# Patient Record
Sex: MALE | Employment: FULL TIME | URBAN - METROPOLITAN AREA
[De-identification: names, ages, dates, MRNs, and addresses within clinical notes are randomized per-mention and may not be internally consistent; named-entity substitution may affect disease eponyms.]

---

## 2020-06-12 ENCOUNTER — OFFICE VISIT (OUTPATIENT)
Dept: FAMILY MEDICINE | Facility: CLINIC | Age: 30
End: 2020-06-12
Payer: COMMERCIAL

## 2020-06-12 VITALS
SYSTOLIC BLOOD PRESSURE: 124 MMHG | HEART RATE: 90 BPM | WEIGHT: 276 LBS | OXYGEN SATURATION: 97 % | TEMPERATURE: 99.2 F | DIASTOLIC BLOOD PRESSURE: 88 MMHG | HEIGHT: 66 IN | BODY MASS INDEX: 44.36 KG/M2

## 2020-06-12 DIAGNOSIS — H60.391 INFECTIVE OTITIS EXTERNA, RIGHT: Primary | ICD-10-CM

## 2020-06-12 DIAGNOSIS — Z23 NEED FOR VACCINATION: ICD-10-CM

## 2020-06-12 PROBLEM — E66.01 MORBID OBESITY (H): Status: ACTIVE | Noted: 2020-06-12

## 2020-06-12 PROCEDURE — 99203 OFFICE O/P NEW LOW 30 MIN: CPT | Mod: 25 | Performed by: INTERNAL MEDICINE

## 2020-06-12 PROCEDURE — 90471 IMMUNIZATION ADMIN: CPT | Performed by: INTERNAL MEDICINE

## 2020-06-12 PROCEDURE — 90715 TDAP VACCINE 7 YRS/> IM: CPT | Performed by: INTERNAL MEDICINE

## 2020-06-12 RX ORDER — NEOMYCIN SULFATE, POLYMYXIN B SULFATE, HYDROCORTISONE 3.5; 10000; 1 MG/ML; [USP'U]/ML; MG/ML
3 SOLUTION/ DROPS AURICULAR (OTIC) 4 TIMES DAILY
Qty: 10 ML | Refills: 0 | Status: SHIPPED | OUTPATIENT
Start: 2020-06-12

## 2020-06-12 ASSESSMENT — MIFFLIN-ST. JEOR: SCORE: 2169.68

## 2020-06-12 NOTE — PROGRESS NOTES
"Subjective     Chico Mosley is a 29 year old male who presents to clinic today for the following health issues:    HPI   Concern - Right Ear Pain  Onset: 06/10/2020    Description:   Right Ear pain, throbbing and having a hard time hearing.    Intensity: 8/10    Progression of Symptoms:  worsening    Accompanying Signs & Symptoms:  None    Previous history of similar problem:   Once a ear he gets a ear ache    Precipitating factors:   Worsened by: laying on the ear, touching    Alleviating factors:  Improved by: nothing    Therapies Tried and outcome: Over the counter ear drops that he said made it feel worse.      Chico developed right ear pain a couple days ago.  There has been slight drainage. Very painful.  No URI symptoms.     He is otherwise healthy, on no medications. Works at a [employer].     Reviewed and updated as needed this visit by Provider  Meds  Problems         Review of Systems   Const, HEENT reviewed,  otherwise negative unless noted above.        Objective    /88   Pulse 90   Temp 99.2  F (37.3  C) (Tympanic)   Ht 1.676 m (5' 6\")   Wt 125.2 kg (276 lb)   SpO2 97%   BMI 44.55 kg/m    Body mass index is 44.55 kg/m .  Physical Exam   Gen: pleasant, obese young man, no distress  HEENT: R ear canal is erythematous and swollen, TM appears normal.  L ear canal and TM normal.   Psych: normal affect, linear, appropriate             Assessment & Plan     1. Infective otitis externa, right  Ibuprofen and tylenol for pain control   - neomycin-polymyxin-hydrocortisone (CORTISPORIN) 3.5-96021-5 otic solution; Place 3 drops into the right ear 4 times daily For 5-7 days  Dispense: 10 mL; Refill: 0    2. Need for vaccination  - TDAP VACCINE (ADACEL) [28471.002]           Return in about 2 days (around 6/14/2020) for If no improvement.    Isabelle Rodas MD  [facility] [place]          "

## 2020-06-29 ENCOUNTER — OFFICE VISIT (OUTPATIENT)
Dept: FAMILY MEDICINE | Facility: CLINIC | Age: 30
End: 2020-06-29
Payer: COMMERCIAL

## 2020-06-29 ENCOUNTER — TELEPHONE (OUTPATIENT)
Dept: FAMILY MEDICINE | Facility: CLINIC | Age: 30
End: 2020-06-29

## 2020-06-29 VITALS
DIASTOLIC BLOOD PRESSURE: 80 MMHG | WEIGHT: 278 LBS | BODY MASS INDEX: 44.87 KG/M2 | SYSTOLIC BLOOD PRESSURE: 121 MMHG | OXYGEN SATURATION: 97 % | TEMPERATURE: 98.1 F | HEART RATE: 81 BPM

## 2020-06-29 DIAGNOSIS — H60.391 INFECTIVE OTITIS EXTERNA, RIGHT: Primary | ICD-10-CM

## 2020-06-29 PROCEDURE — 99213 OFFICE O/P EST LOW 20 MIN: CPT | Performed by: INTERNAL MEDICINE

## 2020-06-29 RX ORDER — CIPROFLOXACIN AND DEXAMETHASONE 3; 1 MG/ML; MG/ML
4 SUSPENSION/ DROPS AURICULAR (OTIC) 2 TIMES DAILY
Qty: 1 BOTTLE | Refills: 0 | Status: SHIPPED | OUTPATIENT
Start: 2020-06-29

## 2020-06-29 SDOH — HEALTH STABILITY: MENTAL HEALTH: HOW OFTEN DO YOU HAVE A DRINK CONTAINING ALCOHOL?: 2-3 TIMES A WEEK

## 2020-06-29 NOTE — TELEPHONE ENCOUNTER
Reason for Call:  Other prescription    Detailed comments: Pt was told to call in if rx is too expensive at the pharmacy. With insurance, out of pocket costs is still over $300 for ciprofloxacin-dexamethasone (CIPRODEX) 0.3-0.1 % otic suspension .    Pt is wondering if there is another prescription they can try. Pharmacy is [id number] [place] [address] [place] [address].    Phone Number Patient can be reached at: Cell number on file:    Telephone Information:   Mobile [phone]       Best Time: any    Can we leave a detailed message on this number? YES    Call taken on 6/29/2020 at 5:35 PM by Maribeth Briceno

## 2020-06-29 NOTE — PROGRESS NOTES
Subjective     Chico Mosley is a 29 year old male who presents to clinic today for the following health issues:    HPI   Acute Illness   Acute illness concerns:  Ear pain   Onset:  Over 2 weeks     Fever: no     Chills/Sweats: no     Headache (location?): no     Sinus Pressure:no    Conjunctivitis:  no    Ear Pain: YES: right    Rhinorrhea: no     Congestion: no     Sore Throat: no      Cough: no    Wheeze: no     Decreased Appetite: no     Nausea: no     Vomiting: no     Diarrhea:  no     Dysuria/Freq.: no     Fatigue/Achiness: no     Sick/Strep Exposure: no      Therapies Tried and outcome:  Ear drops     I saw Chico on 6/12 with otitis externa.  He took cortisporin drops for about 6 days, and the symptoms resolved.  Then about a week later, 6/24, started getting the same symptoms again so restarted the drops.  It is better pain-wise, but only about 50% better after 5 days of drops and he can't hear anything in the morning.  Hasn't noticed any drainage.   Can't hear anything in the morning             Reviewed and updated as needed this visit by Provider         Review of Systems   Constitutional, HEENT, cardiovascular, pulmonary, gi and gu systems are negative, except as otherwise noted.      Objective    /80   Pulse 81   Temp 98.1  F (36.7  C) (Tympanic)   Wt 126.1 kg (278 lb)   SpO2 97%   BMI 44.87 kg/m    Body mass index is 44.87 kg/m .  Physical Exam   Gen: pleasant, well appearing man, no distress   HEENT: R ear canal is erythematous and swollen with some possible exudate, but can see to TM and appears intact. L ear canal and TM are normal.             Assessment & Plan     1. Infective otitis externa, right  Not improving now with cortisporin, will try new abx.  If this isn't effective, will need to see ENT.   - ciprofloxacin-dexamethasone (CIPRODEX) 0.3-0.1 % otic suspension; Place 4 drops into the right ear 2 times daily  Dispense: 1 Bottle; Refill: 0     BMI:   Estimated body mass index  "is 44.87 kg/m  as calculated from the following:    Height as of 6/12/20: 1.676 m (5' 6\").    Weight as of this encounter: 126.1 kg (278 lb).           Return in about 3 days (around 7/2/2020) for If no improvement.    Isabelle Rodas MD  [facility] [place]      "

## 2020-06-30 RX ORDER — TOBRAMYCIN AND DEXAMETHASONE 3; 1 MG/ML; MG/ML
SUSPENSION/ DROPS OPHTHALMIC
Qty: 10 ML | Refills: 0 | Status: SHIPPED | OUTPATIENT
Start: 2020-06-30

## 2020-10-16 ENCOUNTER — TELEPHONE (OUTPATIENT)
Dept: FAMILY MEDICINE | Facility: CLINIC | Age: 30
End: 2020-10-16

## 2020-10-16 DIAGNOSIS — H93.19 TINNITUS, UNSPECIFIED LATERALITY: Primary | ICD-10-CM

## 2020-10-16 NOTE — TELEPHONE ENCOUNTER
Pt calling and would ENT referral. He is having a lot of tinnitus in his ear. No pain just a lot of ringing in the ear and some hearing loss. Please place referral if appropriate and TC to notify the pt [phone]  Tracy Kilpatrick,

## 2020-10-26 ENCOUNTER — TRANSFERRED RECORDS (OUTPATIENT)
Dept: HEALTH INFORMATION MANAGEMENT | Facility: CLINIC | Age: 30
End: 2020-10-26

## 2021-05-01 ENCOUNTER — HEALTH MAINTENANCE LETTER (OUTPATIENT)
Age: 31
End: 2021-05-01

## 2021-10-10 ENCOUNTER — HEALTH MAINTENANCE LETTER (OUTPATIENT)
Age: 31
End: 2021-10-10

## 2022-02-21 ENCOUNTER — TRANSFERRED RECORDS (OUTPATIENT)
Dept: HEALTH INFORMATION MANAGEMENT | Facility: CLINIC | Age: 32
End: 2022-02-21
Payer: COMMERCIAL

## 2022-05-22 ENCOUNTER — HEALTH MAINTENANCE LETTER (OUTPATIENT)
Age: 32
End: 2022-05-22

## 2022-09-24 ENCOUNTER — HEALTH MAINTENANCE LETTER (OUTPATIENT)
Age: 32
End: 2022-09-24

## 2023-06-04 ENCOUNTER — HEALTH MAINTENANCE LETTER (OUTPATIENT)
Age: 33
End: 2023-06-04